# Patient Record
Sex: FEMALE | Race: WHITE | Employment: PART TIME | URBAN - METROPOLITAN AREA
[De-identification: names, ages, dates, MRNs, and addresses within clinical notes are randomized per-mention and may not be internally consistent; named-entity substitution may affect disease eponyms.]

---

## 2017-01-09 ENCOUNTER — HOSPITAL ENCOUNTER (EMERGENCY)
Age: 67
Discharge: HOME | End: 2017-01-09
Attending: EMERGENCY MEDICINE
Payer: COMMERCIAL

## 2017-01-09 VITALS
TEMPERATURE: 97.7 F | HEART RATE: 84 BPM | RESPIRATION RATE: 20 BRPM | DIASTOLIC BLOOD PRESSURE: 72 MMHG | HEIGHT: 65 IN | OXYGEN SATURATION: 99 % | BODY MASS INDEX: 24.99 KG/M2 | SYSTOLIC BLOOD PRESSURE: 154 MMHG | WEIGHT: 150 LBS

## 2017-01-09 DIAGNOSIS — N30.01 ACUTE CYSTITIS WITH HEMATURIA: Primary | ICD-10-CM

## 2017-01-09 LAB
APPEARANCE UR: ABNORMAL
BACTERIA URNS QL MICRO: NEGATIVE /HPF
BILIRUB UR QL: NEGATIVE
COLOR UR: ABNORMAL
EPITH CASTS URNS QL MICRO: ABNORMAL /LPF
GLUCOSE UR STRIP.AUTO-MCNC: NEGATIVE MG/DL
HGB UR QL STRIP: ABNORMAL
HYALINE CASTS URNS QL MICRO: ABNORMAL /LPF (ref 0–5)
KETONES UR QL STRIP.AUTO: NEGATIVE MG/DL
LEUKOCYTE ESTERASE UR QL STRIP.AUTO: ABNORMAL
NITRITE UR QL STRIP.AUTO: NEGATIVE
PH UR STRIP: 6 [PH] (ref 5–8)
PROT UR STRIP-MCNC: 100 MG/DL
RBC #/AREA URNS HPF: >100 /HPF (ref 0–5)
SP GR UR REFRACTOMETRY: 1.01 (ref 1–1.03)
UA: UC IF INDICATED,UAUC: ABNORMAL
UROBILINOGEN UR QL STRIP.AUTO: 0.2 EU/DL (ref 0.2–1)
WBC URNS QL MICRO: >100 /HPF (ref 0–4)

## 2017-01-09 PROCEDURE — 74011250637 HC RX REV CODE- 250/637: Performed by: EMERGENCY MEDICINE

## 2017-01-09 PROCEDURE — 81001 URINALYSIS AUTO W/SCOPE: CPT | Performed by: EMERGENCY MEDICINE

## 2017-01-09 PROCEDURE — 99283 EMERGENCY DEPT VISIT LOW MDM: CPT

## 2017-01-09 PROCEDURE — 87086 URINE CULTURE/COLONY COUNT: CPT | Performed by: EMERGENCY MEDICINE

## 2017-01-09 RX ORDER — CEPHALEXIN 250 MG/1
500 CAPSULE ORAL
Status: COMPLETED | OUTPATIENT
Start: 2017-01-09 | End: 2017-01-09

## 2017-01-09 RX ORDER — PHENAZOPYRIDINE HYDROCHLORIDE 100 MG/1
200 TABLET, FILM COATED ORAL
Status: COMPLETED | OUTPATIENT
Start: 2017-01-09 | End: 2017-01-09

## 2017-01-09 RX ORDER — PHENAZOPYRIDINE HYDROCHLORIDE 200 MG/1
200 TABLET, FILM COATED ORAL 3 TIMES DAILY
Qty: 6 TAB | Refills: 0 | Status: SHIPPED | OUTPATIENT
Start: 2017-01-09 | End: 2017-01-11

## 2017-01-09 RX ORDER — CEPHALEXIN 500 MG/1
500 CAPSULE ORAL 3 TIMES DAILY
Qty: 21 CAP | Refills: 0 | Status: SHIPPED | OUTPATIENT
Start: 2017-01-09 | End: 2017-01-16

## 2017-01-09 RX ADMIN — CEPHALEXIN 500 MG: 250 CAPSULE ORAL at 01:26

## 2017-01-09 RX ADMIN — PHENAZOPYRIDINE HYDROCHLORIDE 200 MG: 100 TABLET ORAL at 01:26

## 2017-01-09 NOTE — ED TRIAGE NOTES
Patient arrives with c/o urinary pain, bleeding, urgency and burning onset today. Denies taking anything for pain.

## 2017-01-09 NOTE — ED PROVIDER NOTES
HPI Comments: 74 y.o. female with past medical history significant for thyroid cancer, thyroidectomy, and overactive bladder who presents from home with chief complaint of dysuria. Pt reports that she just left her house to drive around town when she felt sudden urinary urgency. Pt drove back home and experienced mild dysuria. Pt reports that as the day went on, the frequency and dysuria worsened and was accompanied with hematuria. Pt also reports she feels \"shaky\". Pt reports a similar episode a few years ago on yoselin melody with similar sx and time progression but reports her urine was darker back then. Pt denies N/V, fever, abdominal pain, and back pain. There are no other acute medical concerns at this time. Social hx: nonsmoker, no EtOH use  PCP: Wade Cummings MD  Note written by Ana Rangel, as dictated by Giuliano Tolentino, DO 12:34 AM        The history is provided by the patient. No  was used. No past medical history on file. No past surgical history on file. No family history on file. Social History     Social History    Marital status: UNKNOWN     Spouse name: N/A    Number of children: N/A    Years of education: N/A     Occupational History    Not on file. Social History Main Topics    Smoking status: Not on file    Smokeless tobacco: Not on file    Alcohol use Not on file    Drug use: Not on file    Sexual activity: Not on file     Other Topics Concern    Not on file     Social History Narrative    No narrative on file     ALLERGIES: Review of patient's allergies indicates no known allergies. Review of Systems   Constitutional: Negative for appetite change, chills, fever and unexpected weight change. HENT: Negative for ear pain, hearing loss, rhinorrhea and trouble swallowing. Eyes: Negative for pain and visual disturbance. Respiratory: Negative for cough, chest tightness and shortness of breath.     Cardiovascular: Negative for chest pain and palpitations. Gastrointestinal: Negative for abdominal distention, abdominal pain, blood in stool and vomiting. Genitourinary: Positive for dysuria, frequency and hematuria. Negative for urgency. Musculoskeletal: Negative for back pain and myalgias. Skin: Negative for rash. Neurological: Negative for dizziness, syncope, weakness and numbness. Psychiatric/Behavioral: Negative for confusion and suicidal ideas. All other systems reviewed and are negative. Vitals:    01/09/17 0015   BP: 154/72   Pulse: 84   Resp: 20   Temp: 97.7 °F (36.5 °C)   SpO2: 99%   Weight: 68 kg (150 lb)   Height: 5' 5\" (1.651 m)            Physical Exam   Constitutional: She is oriented to person, place, and time. She appears well-developed and well-nourished. No distress. HENT:   Head: Normocephalic and atraumatic. Right Ear: External ear normal.   Left Ear: External ear normal.   Nose: Nose normal.   Mouth/Throat: Oropharynx is clear and moist. No oropharyngeal exudate. Eyes: Conjunctivae and EOM are normal. Pupils are equal, round, and reactive to light. Right eye exhibits no discharge. Left eye exhibits no discharge. No scleral icterus. Neck: Normal range of motion. Neck supple. No JVD present. No tracheal deviation present. Cardiovascular: Normal rate, regular rhythm, normal heart sounds and intact distal pulses. Exam reveals no gallop and no friction rub. No murmur heard. Pulmonary/Chest: Effort normal and breath sounds normal. No stridor. No respiratory distress. She has no decreased breath sounds. She has no wheezes. She has no rhonchi. She has no rales. She exhibits no tenderness. Abdominal: Soft. Bowel sounds are normal. She exhibits no distension. There is no tenderness. There is no rebound and no guarding. Musculoskeletal: Normal range of motion. She exhibits no edema or tenderness. Neurological: She is alert and oriented to person, place, and time.  She has normal strength and normal reflexes. No cranial nerve deficit or sensory deficit. She exhibits normal muscle tone. Coordination normal. GCS eye subscore is 4. GCS verbal subscore is 5. GCS motor subscore is 6. Skin: Skin is warm and dry. No rash noted. She is not diaphoretic. No erythema. No pallor. Psychiatric: She has a normal mood and affect. Her behavior is normal. Judgment and thought content normal.   Nursing note and vitals reviewed. Note written by Ana Verduzco, as dictated by Libra Uriostegui DO 12:35 AM    MDM  Number of Diagnoses or Management Options  Acute cystitis with hematuria:      Amount and/or Complexity of Data Reviewed  Clinical lab tests: ordered and reviewed    Risk of Complications, Morbidity, and/or Mortality  Presenting problems: low  Diagnostic procedures: low  Management options: low    Patient Progress  Patient progress: stable    ED Course       Procedures  Chief Complaint   Patient presents with    Urinary Pain       1:50 AM  The patients presenting problems have been discussed, and they are in agreement with the care plan formulated and outlined with them. I have encouraged them to ask questions as they arise throughout their visit.     MEDICATIONS GIVEN:  Medications   cephALEXin (KEFLEX) capsule 500 mg (500 mg Oral Given 1/9/17 0126)   phenazopyridine (PYRIDIUM) tablet 200 mg (200 mg Oral Given 1/9/17 0126)       LABS REVIEWED:  Recent Results (from the past 24 hour(s))   URINALYSIS W/ REFLEX CULTURE    Collection Time: 01/09/17 12:37 AM   Result Value Ref Range    Color YELLOW/STRAW      Appearance CLOUDY (A) CLEAR      Specific gravity 1.014 1.003 - 1.030      pH (UA) 6.0 5.0 - 8.0      Protein 100 (A) NEG mg/dL    Glucose NEGATIVE  NEG mg/dL    Ketone NEGATIVE  NEG mg/dL    Bilirubin NEGATIVE  NEG      Blood LARGE (A) NEG      Urobilinogen 0.2 0.2 - 1.0 EU/dL    Nitrites NEGATIVE  NEG      Leukocyte Esterase LARGE (A) NEG      WBC >100 (H) 0 - 4 /hpf    RBC >100 (H) 0 - 5 /hpf    Epithelial cells FEW FEW /lpf    Bacteria NEGATIVE  NEG /hpf    UA:UC IF INDICATED URINE CULTURE ORDERED (A) CNI      Hyaline Cast 0-2 0 - 5 /lpf       VITAL SIGNS:  Patient Vitals for the past 12 hrs:   Temp Pulse Resp BP SpO2   01/09/17 0015 97.7 °F (36.5 °C) 84 20 154/72 99 %       RADIOLOGY RESULTS:  The following have been ordered and reviewed:  No orders to display     PROGRESS NOTES:  Discussed results and plan with patient. Patient will be discharged home with PCP followup. Patient instructed to return to the emergency room for any worsening symptoms or any other concerns. DIAGNOSIS:    1. Acute cystitis with hematuria        PLAN:  Follow-up Information     Follow up With Details Comments Contact Info    Shayne Rg MD In 1 week As needed [id number] [id number] [phone]      [facility] EMERGENCY DEPT  If symptoms worsen [address] [facility]  [phone]        Current Discharge Medication List      START taking these medications    Details   cephALEXin (KEFLEX) 500 mg capsule Take 1 Cap by mouth three (3) times daily for 7 days. Qty: 21 Cap, Refills: 0      phenazopyridine (PYRIDIUM) 200 mg tablet Take 1 Tab by mouth three (3) times daily for 2 days. Qty: 6 Tab, Refills: 0               ED COURSE: The patients hospital course has been uncomplicated.

## 2017-01-09 NOTE — ED NOTES
Dr. Brendan Ibarra has reviewed discharge instructions with patient and self including prescription(s) if applicable. Patient has received and verbalizes understanding of all instructions and has no further questions at this time. Patient exits ED via ambulatory accompanied by self. Patient in no acute distress.

## 2017-01-09 NOTE — DISCHARGE INSTRUCTIONS
We hope that we have addressed all of your medical concerns. The examination and treatment you received in the Emergency Department were for an emergent problem and were not intended as complete care. It is important that you follow up with your healthcare provider(s) for ongoing care. If your symptoms worsen or do not improve as expected, and you are unable to reach your usual health care provider(s), you should return to the Emergency Department. Today's healthcare is undergoing tremendous change, and patient satisfaction surveys are one of the many tools to assess the quality of medical care. You may receive a survey from the [employer] organization regarding your experience in the Emergency Department. I hope that your experience has been completely positive, particularly the medical care that I provided. As such, please participate in the survey; anything less than excellent does not meet my expectations or intentions. [facility] and [facility] participate in nationally recognized quality of care measures. If your blood pressure is greater than 120/80, as reported below, we urge that you seek medical care to address the potential of high blood pressure, commonly known as hypertension. Hypertension can be hereditary or can be caused by certain medical conditions, pain, stress, or \"white coat syndrome. \"       Please make an appointment with your health care provider(s) for follow up of your Emergency Department visit. VITALS:   Patient Vitals for the past 8 hrs:   Temp Pulse Resp BP SpO2   01/09/17 0015 97.7 °F (36.5 °C) 84 20 154/72 99 %          Thank you for allowing us to provide you with medical care today. We realize that you have many choices for your emergency care needs. Please choose us in the future for any continued health care needs. Concepcion Koehler, [facility]. Office: [phone]            Recent Results (from the past 24 hour(s))   URINALYSIS W/ REFLEX CULTURE    Collection Time: 01/09/17 12:37 AM   Result Value Ref Range    Color YELLOW/STRAW      Appearance CLOUDY (A) CLEAR      Specific gravity 1.014 1.003 - 1.030      pH (UA) 6.0 5.0 - 8.0      Protein 100 (A) NEG mg/dL    Glucose NEGATIVE  NEG mg/dL    Ketone NEGATIVE  NEG mg/dL    Bilirubin NEGATIVE  NEG      Blood LARGE (A) NEG      Urobilinogen 0.2 0.2 - 1.0 EU/dL    Nitrites NEGATIVE  NEG      Leukocyte Esterase LARGE (A) NEG      WBC >100 (H) 0 - 4 /hpf    RBC >100 (H) 0 - 5 /hpf    Epithelial cells FEW FEW /lpf    Bacteria NEGATIVE  NEG /hpf    UA:UC IF INDICATED URINE CULTURE ORDERED (A) CNI      Hyaline Cast 0-2 0 - 5 /lpf       No results found. Urinary Tract Infection in Women: Care Instructions  Your Care Instructions    A urinary tract infection, or UTI, is a general term for an infection anywhere between the kidneys and the urethra (where urine comes out). Most UTIs are bladder infections. They often cause pain or burning when you urinate. UTIs are caused by bacteria and can be cured with antibiotics. Be sure to complete your treatment so that the infection goes away. Follow-up care is a key part of your treatment and safety. Be sure to make and go to all appointments, and call your doctor if you are having problems. It's also a good idea to know your test results and keep a list of the medicines you take. How can you care for yourself at home? · Take your antibiotics as directed. Do not stop taking them just because you feel better. You need to take the full course of antibiotics. · Drink extra water and other fluids for the next day or two. This may help wash out the bacteria that are causing the infection.  (If you have kidney, heart, or liver disease and have to limit fluids, talk with your doctor before you increase your fluid intake.)  · Avoid drinks that are carbonated or have caffeine. They can irritate the bladder. · Urinate often. Try to empty your bladder each time. · To relieve pain, take a hot bath or lay a heating pad set on low over your lower belly or genital area. Never go to sleep with a heating pad in place. To prevent UTIs  · Drink plenty of water each day. This helps you urinate often, which clears bacteria from your system. (If you have kidney, heart, or liver disease and have to limit fluids, talk with your doctor before you increase your fluid intake.)  · Consider adding cranberry juice to your diet. · Urinate when you need to. · Urinate right after you have sex. · Change sanitary pads often. · Avoid douches, bubble baths, feminine hygiene sprays, and other feminine hygiene products that have deodorants. · After going to the bathroom, wipe from front to back. When should you call for help? Call your doctor now or seek immediate medical care if:  · Symptoms such as fever, chills, nausea, or vomiting get worse or appear for the first time. · You have new pain in your back just below your rib cage. This is called flank pain. · There is new blood or pus in your urine. · You have any problems with your antibiotic medicine. Watch closely for changes in your health, and be sure to contact your doctor if:  · You are not getting better after taking an antibiotic for 2 days. · Your symptoms go away but then come back. Where can you learn more? Go to http://nellie-pirere.info/. Enter Z678 in the search box to learn more about \"Urinary Tract Infection in Women: Care Instructions. \"  Current as of: August 12, 2016  Content Version: 11.1  © 8490-6918 [employer]. Care instructions adapted under license by [employer] (which disclaims liability or warranty for this information).  If you have questions about a medical condition or this instruction, always ask your healthcare professional. Elizabeth Yu any warranty or liability for your use of this information.

## 2017-01-10 LAB
BACTERIA SPEC CULT: NORMAL
CC UR VC: NORMAL
SERVICE CMNT-IMP: NORMAL

## 2021-02-04 ENCOUNTER — HOSPITAL ENCOUNTER (EMERGENCY)
Age: 71
Discharge: HOME | End: 2021-02-04
Attending: STUDENT IN AN ORGANIZED HEALTH CARE EDUCATION/TRAINING PROGRAM
Payer: MEDICARE

## 2021-02-04 ENCOUNTER — APPOINTMENT (OUTPATIENT)
Dept: GENERAL RADIOLOGY | Age: 71
End: 2021-02-04
Attending: EMERGENCY MEDICINE
Payer: MEDICARE

## 2021-02-04 VITALS
HEART RATE: 66 BPM | BODY MASS INDEX: 26.46 KG/M2 | WEIGHT: 155 LBS | SYSTOLIC BLOOD PRESSURE: 145 MMHG | DIASTOLIC BLOOD PRESSURE: 82 MMHG | RESPIRATION RATE: 18 BRPM | HEIGHT: 64 IN | TEMPERATURE: 97.1 F | OXYGEN SATURATION: 100 %

## 2021-02-04 DIAGNOSIS — R07.89 ATYPICAL CHEST PAIN: Primary | ICD-10-CM

## 2021-02-04 LAB
ALBUMIN SERPL-MCNC: 3.7 G/DL (ref 3.5–5)
ALBUMIN/GLOB SERPL: 1 {RATIO} (ref 1.1–2.2)
ALP SERPL-CCNC: 77 U/L (ref 45–117)
ALT SERPL-CCNC: 26 U/L (ref 12–78)
ANION GAP SERPL CALC-SCNC: 5 MMOL/L (ref 5–15)
AST SERPL-CCNC: 14 U/L (ref 15–37)
BASOPHILS # BLD: 0.1 K/UL (ref 0–0.1)
BASOPHILS NFR BLD: 1 % (ref 0–1)
BILIRUB SERPL-MCNC: 0.9 MG/DL (ref 0.2–1)
BUN SERPL-MCNC: 12 MG/DL (ref 6–20)
BUN/CREAT SERPL: 16 (ref 12–20)
CALCIUM SERPL-MCNC: 8.6 MG/DL (ref 8.5–10.1)
CHLORIDE SERPL-SCNC: 107 MMOL/L (ref 97–108)
CO2 SERPL-SCNC: 28 MMOL/L (ref 21–32)
COMMENT, HOLDF: NORMAL
CREAT SERPL-MCNC: 0.73 MG/DL (ref 0.55–1.02)
DIFFERENTIAL METHOD BLD: ABNORMAL
EOSINOPHIL # BLD: 0.1 K/UL (ref 0–0.4)
EOSINOPHIL NFR BLD: 1 % (ref 0–7)
ERYTHROCYTE [DISTWIDTH] IN BLOOD BY AUTOMATED COUNT: 12 % (ref 11.5–14.5)
GLOBULIN SER CALC-MCNC: 3.8 G/DL (ref 2–4)
GLUCOSE SERPL-MCNC: 88 MG/DL (ref 65–100)
HCT VFR BLD AUTO: 39 % (ref 35–47)
HGB BLD-MCNC: 12.7 G/DL (ref 11.5–16)
IMM GRANULOCYTES # BLD AUTO: 0 K/UL (ref 0–0.04)
IMM GRANULOCYTES NFR BLD AUTO: 0 % (ref 0–0.5)
LYMPHOCYTES # BLD: 3.9 K/UL (ref 0.8–3.5)
LYMPHOCYTES NFR BLD: 43 % (ref 12–49)
MAGNESIUM SERPL-MCNC: 2.4 MG/DL (ref 1.6–2.4)
MCH RBC QN AUTO: 30.4 PG (ref 26–34)
MCHC RBC AUTO-ENTMCNC: 32.6 G/DL (ref 30–36.5)
MCV RBC AUTO: 93.3 FL (ref 80–99)
MONOCYTES # BLD: 0.5 K/UL (ref 0–1)
MONOCYTES NFR BLD: 6 % (ref 5–13)
NEUTS SEG # BLD: 4.5 K/UL (ref 1.8–8)
NEUTS SEG NFR BLD: 49 % (ref 32–75)
NRBC # BLD: 0 K/UL (ref 0–0.01)
NRBC BLD-RTO: 0 PER 100 WBC
PLATELET # BLD AUTO: 233 K/UL (ref 150–400)
PMV BLD AUTO: 9.5 FL (ref 8.9–12.9)
POTASSIUM SERPL-SCNC: 3.9 MMOL/L (ref 3.5–5.1)
PROT SERPL-MCNC: 7.5 G/DL (ref 6.4–8.2)
RBC # BLD AUTO: 4.18 M/UL (ref 3.8–5.2)
SAMPLES BEING HELD,HOLD: NORMAL
SODIUM SERPL-SCNC: 140 MMOL/L (ref 136–145)
TROPONIN I SERPL-MCNC: <0.05 NG/ML
TSH SERPL DL<=0.05 MIU/L-ACNC: 0.45 UIU/ML (ref 0.36–3.74)
WBC # BLD AUTO: 9.1 K/UL (ref 3.6–11)

## 2021-02-04 PROCEDURE — 99283 EMERGENCY DEPT VISIT LOW MDM: CPT

## 2021-02-04 PROCEDURE — 80053 COMPREHEN METABOLIC PANEL: CPT

## 2021-02-04 PROCEDURE — 93005 ELECTROCARDIOGRAM TRACING: CPT

## 2021-02-04 PROCEDURE — 85025 COMPLETE CBC W/AUTO DIFF WBC: CPT

## 2021-02-04 PROCEDURE — 84443 ASSAY THYROID STIM HORMONE: CPT

## 2021-02-04 PROCEDURE — 83735 ASSAY OF MAGNESIUM: CPT

## 2021-02-04 PROCEDURE — 84484 ASSAY OF TROPONIN QUANT: CPT

## 2021-02-04 PROCEDURE — 36415 COLL VENOUS BLD VENIPUNCTURE: CPT

## 2021-02-04 PROCEDURE — 71045 X-RAY EXAM CHEST 1 VIEW: CPT

## 2021-02-04 NOTE — ED TRIAGE NOTES
Pt c/o chest pressure x 2 weeks and sharp chest pains yesterday. States she was trying to make an appt with Dr Alondra Landrum, but was referred here instead. Hx of thyroid cancer.

## 2021-02-04 NOTE — ED PROVIDER NOTES
57-year-old female history of thyroid disease presents to the emergency department today with intermittent chest pressure with episodes of sharp chest pain yesterday. She denies any shortness of breath or nausea or vomiting or fatigue. She has no history of cardiovascular disease but called her primary care doctor today for an appointment tomorrow and was advised to come to the emergency department for further work-up. There is no exertional component to her pain. Sometimes it is worse with eating. The history is provided by the patient and medical records. Chest Pain (Angina)   This is a new problem. The current episode started more than 1 week ago. The problem has not changed since onset. The pain is moderate. The quality of the pain is described as pressure-like and sharp. The pain does not radiate. Pertinent negatives include no abdominal pain, no back pain, no claudication, no cough, no diaphoresis, no exertional chest pressure, no fever, no headaches, no irregular heartbeat, no leg pain, no malaise/fatigue, no nausea, no near-syncope, no palpitations, no shortness of breath, no vomiting and no weakness. No past medical history on file. No past surgical history on file. No family history on file.     Social History     Socioeconomic History    Marital status: SINGLE     Spouse name: Not on file    Number of children: Not on file    Years of education: Not on file    Highest education level: Not on file   Occupational History    Not on file   Social Needs    Financial resource strain: Not on file    Food insecurity     Worry: Not on file     Inability: Not on file    Transportation needs     Medical: Not on file     Non-medical: Not on file   Tobacco Use    Smoking status: Not on file   Substance and Sexual Activity    Alcohol use: Not on file    Drug use: Not on file    Sexual activity: Not on file   Lifestyle    Physical activity     Days per week: Not on file     Minutes per session: Not on file    Stress: Not on file   Relationships    Social connections     Talks on phone: Not on file     Gets together: Not on file     Attends [employer] service: Not on file     Active member of club or organization: Not on file     Attends meetings of clubs or organizations: Not on file     Relationship status: Not on file    Intimate partner violence     Fear of current or ex partner: Not on file     Emotionally abused: Not on file     Physically abused: Not on file     Forced sexual activity: Not on file   Other Topics Concern    Not on file   Social History Narrative    Not on file         ALLERGIES: Patient has no known allergies. Review of Systems   Constitutional: Negative for diaphoresis, fatigue, fever and malaise/fatigue. HENT: Negative for sneezing and sore throat. Respiratory: Negative for cough and shortness of breath. Cardiovascular: Positive for chest pain. Negative for palpitations, claudication, leg swelling and near-syncope. Gastrointestinal: Negative for abdominal pain, diarrhea, nausea and vomiting. Genitourinary: Negative for difficulty urinating and dysuria. Musculoskeletal: Negative for arthralgias, back pain and myalgias. Skin: Negative for color change and rash. Neurological: Negative for weakness and headaches. Psychiatric/Behavioral: Negative for agitation and behavioral problems. Vitals:    02/04/21 1801   BP: (!) 145/82   Pulse: 66   Resp: 18   Temp: 97.1 °F (36.2 °C)   SpO2: 100%   Weight: 70.3 kg (155 lb)   Height: 5' 4\" (1.626 m)            Physical Exam  Vitals signs and nursing note reviewed. Constitutional:       General: She is not in acute distress. Appearance: Normal appearance. She is normal weight. She is not ill-appearing, toxic-appearing or diaphoretic. HENT:      Head: Normocephalic and atraumatic. Nose: Nose normal.      Mouth/Throat:      Mouth: Mucous membranes are moist.      Pharynx: Oropharynx is clear. Eyes:      Extraocular Movements: Extraocular movements intact. Conjunctiva/sclera: Conjunctivae normal.      Pupils: Pupils are equal, round, and reactive to light. Neck:      Musculoskeletal: Normal range of motion and neck supple. No muscular tenderness. Cardiovascular:      Rate and Rhythm: Normal rate and regular rhythm. Pulses: Normal pulses. Heart sounds: Normal heart sounds. No murmur. Pulmonary:      Effort: Pulmonary effort is normal. No respiratory distress. Breath sounds: Normal breath sounds. Abdominal:      General: There is no distension. Palpations: Abdomen is soft. Tenderness: There is no abdominal tenderness. There is no guarding or rebound. Musculoskeletal: Normal range of motion. General: No swelling, tenderness, deformity or signs of injury. Right lower leg: No edema. Left lower leg: No edema. Skin:     General: Skin is warm and dry. Capillary Refill: Capillary refill takes less than 2 seconds. Neurological:      General: No focal deficit present. Mental Status: She is alert and oriented to person, place, and time. Psychiatric:         Mood and Affect: Mood normal.         Behavior: Behavior normal.          MDM  Number of Diagnoses or Management Options  Diagnosis management comments: 47-year-old female presents as above with episodes of chest pain. Her work-up in the emergency department has been reassuring with normal troponin and EKG and chest x-ray and exam.  She is low risk by heart score criteria with age is her only risk factor. Plan to discharge with instructions to follow-up with primary care and return if needed. Amount and/or Complexity of Data Reviewed  Clinical lab tests: reviewed  Tests in the radiology section of CPT®: reviewed           Procedures        ED EKG interpretation:  Rhythm: normal sinus rhythm.  Rate (approx.): 64.  Axis: normal.  ST segment:  No concerning ST elevations or depressions. This EKG was interpreted by Ericka Ward MD,ED Provider.

## 2021-02-05 NOTE — DISCHARGE INSTRUCTIONS
Please follow-up with your primary care doctor in about 3 days. You are low risk for cardiovascular disease, however, this does not mean no risk. Please return to the emergency department for new or worsening symptoms or other concerns.

## 2021-02-07 LAB
ATRIAL RATE: 64 BPM
CALCULATED P AXIS, ECG09: 73 DEGREES
CALCULATED R AXIS, ECG10: 3 DEGREES
CALCULATED T AXIS, ECG11: 79 DEGREES
DIAGNOSIS, 93000: NORMAL
P-R INTERVAL, ECG05: 158 MS
Q-T INTERVAL, ECG07: 428 MS
QRS DURATION, ECG06: 90 MS
QTC CALCULATION (BEZET), ECG08: 441 MS
VENTRICULAR RATE, ECG03: 64 BPM

## 2022-08-04 ENCOUNTER — OFFICE VISIT (OUTPATIENT)
Dept: HEMATOLOGY | Age: 72
End: 2022-08-04
Payer: MEDICARE

## 2022-08-04 VITALS
DIASTOLIC BLOOD PRESSURE: 75 MMHG | HEART RATE: 89 BPM | TEMPERATURE: 96.4 F | SYSTOLIC BLOOD PRESSURE: 140 MMHG | WEIGHT: 159 LBS | HEIGHT: 64 IN | BODY MASS INDEX: 27.14 KG/M2 | RESPIRATION RATE: 16 BRPM | OXYGEN SATURATION: 98 %

## 2022-08-04 DIAGNOSIS — Z11.59 ENCOUNTER FOR SCREENING FOR OTHER VIRAL DISEASES: ICD-10-CM

## 2022-08-04 DIAGNOSIS — R97.8 OTHER ABNORMAL TUMOR MARKERS: ICD-10-CM

## 2022-08-04 DIAGNOSIS — R16.0 LIVER MASS: Primary | ICD-10-CM

## 2022-08-04 PROBLEM — E89.0 H/O THYROIDECTOMY: Status: ACTIVE | Noted: 2022-08-04

## 2022-08-04 PROBLEM — K21.9 GERD (GASTROESOPHAGEAL REFLUX DISEASE): Status: ACTIVE | Noted: 2022-08-04

## 2022-08-04 PROBLEM — N39.0 FREQUENT UTI: Status: ACTIVE | Noted: 2022-08-04

## 2022-08-04 PROBLEM — C73 THYROID CANCER (HCC): Status: ACTIVE | Noted: 2022-08-04

## 2022-08-04 PROBLEM — E03.9 ACQUIRED HYPOTHYROIDISM: Status: ACTIVE | Noted: 2022-08-04

## 2022-08-04 PROCEDURE — 99203 OFFICE O/P NEW LOW 30 MIN: CPT | Performed by: INTERNAL MEDICINE

## 2022-08-04 PROCEDURE — G8400 PT W/DXA NO RESULTS DOC: HCPCS | Performed by: INTERNAL MEDICINE

## 2022-08-04 PROCEDURE — 3017F COLORECTAL CA SCREEN DOC REV: CPT | Performed by: INTERNAL MEDICINE

## 2022-08-04 PROCEDURE — 1123F ACP DISCUSS/DSCN MKR DOCD: CPT | Performed by: INTERNAL MEDICINE

## 2022-08-04 PROCEDURE — 1090F PRES/ABSN URINE INCON ASSESS: CPT | Performed by: INTERNAL MEDICINE

## 2022-08-04 PROCEDURE — G0463 HOSPITAL OUTPT CLINIC VISIT: HCPCS | Performed by: INTERNAL MEDICINE

## 2022-08-04 PROCEDURE — G8510 SCR DEP NEG, NO PLAN REQD: HCPCS | Performed by: INTERNAL MEDICINE

## 2022-08-04 PROCEDURE — G8427 DOCREV CUR MEDS BY ELIG CLIN: HCPCS | Performed by: INTERNAL MEDICINE

## 2022-08-04 PROCEDURE — G8417 CALC BMI ABV UP PARAM F/U: HCPCS | Performed by: INTERNAL MEDICINE

## 2022-08-04 PROCEDURE — 1101F PT FALLS ASSESS-DOCD LE1/YR: CPT | Performed by: INTERNAL MEDICINE

## 2022-08-04 PROCEDURE — G8536 NO DOC ELDER MAL SCRN: HCPCS | Performed by: INTERNAL MEDICINE

## 2022-08-04 RX ORDER — TRIAMCINOLONE ACETONIDE 0.25 MG/G
OINTMENT TOPICAL
COMMUNITY
Start: 2022-05-20

## 2022-08-04 RX ORDER — CALCIUM CARBONATE/VITAMIN D3 250-3.125
1 TABLET ORAL DAILY
COMMUNITY
End: 2022-10-11

## 2022-08-04 RX ORDER — LEVOTHYROXINE SODIUM 100 UG/1
TABLET ORAL
COMMUNITY
Start: 2022-05-26

## 2022-08-04 RX ORDER — CRANBERRY FRUIT 450 MG
2 TABLET ORAL DAILY
COMMUNITY

## 2022-08-04 RX ORDER — OMEPRAZOLE 40 MG/1
CAPSULE, DELAYED RELEASE ORAL
COMMUNITY

## 2022-08-04 RX ORDER — VIBEGRON 75 MG/1
75 TABLET, FILM COATED ORAL DAILY
COMMUNITY
Start: 2022-07-16

## 2022-08-04 RX ORDER — PREDNISONE 10 MG/1
TABLET ORAL
COMMUNITY
Start: 2022-08-03 | End: 2022-10-11

## 2022-08-04 RX ORDER — BETAMETHASONE DIPROPIONATE 0.5 MG/G
CREAM TOPICAL 2 TIMES DAILY
COMMUNITY
Start: 2022-03-02

## 2022-08-04 NOTE — PROGRESS NOTES
Identified pt with two pt identifiers(name and ). Reviewed record in preparation for visit and have obtained necessary documentation. Chief Complaint   Patient presents with    New Patient    Other     Liver lesion, establish care      Vitals:    22 0946   BP: (!) 140/75   Pulse: 89   Resp: 16   Temp: (!) 96.4 °F (35.8 °C)   TempSrc: Temporal   SpO2: 98%   Weight: 159 lb (72.1 kg)   Height: 5' 4\" (1.626 m)   PainSc:   0 - No pain       Health Maintenance Review: Patient reminded of \"due or due soon\" health maintenance. I have asked the patient to contact his/her primary care provider (PCP) for follow-up on his/her health maintenance. Coordination of Care Questionnaire:  :   1) Have you been to an emergency room, urgent care, or hospitalized since your last visit? If yes, where when, and reason for visit? no       2. Have seen or consulted any other health care provider since your last visit? If yes, where when, and reason for visit? NO      Patient is accompanied by self I have received verbal consent from Morro Bellamy to discuss any/all medical information while they are present in the room.

## 2022-08-04 NOTE — PROGRESS NOTES
[facility]", MD, [address], [place], [place]      AUGUSTINE Thomas, [facility]     Mehnaz Bradford, [facility]   SALTY Sweeney-LYNNE Rawls, [facility]       Ashley Klein [facility] 136    at [facility]    [address] [employer], [address] [place] [address], [facility] 22.    [phone]    FAX: [address] [place] [address]    at [address], [address]    [phone]    FAX: [phone]       Patient Care Team:  Tung Rivera MD as PCP - General (Family Medicine)  Tung Rivera MD as PCP - [facility] Provider  Josué German MD (Urology)      Problem List  Date Reviewed: 8/4/2022            Codes Class Noted    Liver mass ICD-10-CM: R16.0  ICD-9-CM: 573.8  8/4/2022        Frequent UTI ICD-10-CM: N39.0  ICD-9-CM: 599.0  8/4/2022        Thyroid cancer ([facility] ICD-10-CM: C73  ICD-9-CM: 193  8/4/2022        H/O thyroidectomy ICD-10-CM: E89.0  ICD-9-CM: 246.8  8/4/2022        Acquired hypothyroidism ICD-10-CM: E03.9  ICD-9-CM: 244.9  8/4/2022        GERD (gastroesophageal reflux disease) ICD-10-CM: K21.9  ICD-9-CM: 530.81  8/4/2022           The clinicians listed above have asked me to see Monique Hall in consultation regarding a liver mass. All medical records sent by the referring physicians were reviewed including imaging studies     The patient is a 66 y.o.  female without any history of previous liver disease. The patient underwent a CT scan without contrast for evaluation of frequent UTIs in 7/2022. This demonstrated a single mass measuring 2.8 cm in the right lobe segment 6 of the liver. The following cancer makers were performed; CEA, CA-125.   Both were negative    Imaging studies of the liver are not available suggest a normal liver in addition to the mass. The patient does not have any symptoms which could be attributed to the liver disorder. The patient is not experiencing the following symptoms which are commonly seen in this liver disorder:   fatigue,   pain in the right side over the liver,     The patient completes all daily activities without any functional limitations. ASSESSMENT AND PLAN:  Liver mass   This is of unknown etiology but most likely to be benign. The patient is asymptomatic     Have performed laboratory testing to monitor liver function and degree of liver injury. This included BMP, hepatic panel, CBC with platelet count, INR. Will measure the following serologic cancer markers AFP, CA 19-9, CEA. The  was mildly elevated at 49. Serologic testing for causes of chronic liver disease was negative HCV, HBV     Will perform imaging of the liver with dynamic MRI. Treatment of other medical problems in patients with chronic liver disease  There are no contraindications for the patient to take most medications that are necessary for treatment of other medical issues. Counseling for alcohol in patients with chronic liver disease  The patient was counseled regarding alcohol consumption and the effect of alcohol on chronic liver disease. The patient does not consume any significant amount of alcohol. Vaccinations   Since the patient does not have a chronic liver disease which can lead to liver injury screening for HAV and HBV is not needed. Routine vaccinations against other bacterial and viral agents can be performed as indicated. Annual flu vaccination should be administered if indicated.         ALLERGIES  Allergies   Allergen Reactions    Other Medication Rash     Americaine Topical Spray    Other Plant, Animal, Environmental Rash     Bandages    Benzocaine Rash    Epinephrine Unknown (comments)     Affected breathing caused muscle spasms in back during a dental procedure Hydrocortisone Rash       MEDICATIONS  Current Outpatient Medications   Medication Sig    LevoxyL 100 mcg tablet TAKE 1 TABLET BY MOUTH EVERY DAY IN THE MORNING ON EMPTY STOMACH FOR 90 DAYS    predniSONE (STERAPRED DS) 10 mg dose pack     Gemtesa 75 mg tab Take 75 mg by mouth daily. triamcinolone acetonide (KENALOG) 0.025 % ointment APPLY TOPICALLY 2 TIMES DAILY AS NEEDED FOR RASH FOR UP TO 14 DAYS. FOR UNDERARMS    betamethasone dipropionate (DIPROSONE) 0.05 % topical cream Apply  to affected area two (2) times a day. calcium-vitamin D (Oyster Shell Calcium-Vit D3) 250 mg-125 unit per tablet Take 1 Tablet by mouth in the morning. cranberry extract (Cranberry) 450 mg tab tablet Take 2 Tablets by mouth daily. cyanocobalamin ER 1,000 mcg tablet Take 5,000 Tablets by mouth in the morning. omeprazole (PRILOSEC) 40 mg capsule omeprazole 40 mg capsule,delayed release(DR/EC)    calcium/folic ac/multivit-min (VIACTIV MULTI-VITAMIN PO) Take  by mouth daily. polyvinyl alcohol-povidon,PF, (REFRESH CLASSIC) 1.4-0.6 % ophthalmic solution Administer 1-2 Drops to both eyes as needed. L. rhamnosus GG/inulin ([facility]) Take  by mouth as needed. No current facility-administered medications for this visit. SYSTEM REVIEW NOT RELATED TO LIVER DISEASE OR REVIEWED ABOVE:  Constitution systems: Negative for fever, chills, weight gain, weight loss. Eyes: Negative for visual changes. ENT: Negative for sore throat, painful swallowing. Respiratory: Negative for cough, hemoptysis, SOB. Cardiology: Negative for chest pain, palpitations. GI:  Negative for constipation or diarrhea. : Negative for urinary frequency, dysuria, hematuria, nocturia. Skin: Negative for rash. Hematology: Negative for easy bruising, blood clots. Musculo-skelatal: Negative for back pain, muscle pain, weakness.   Neurologic: Negative for headaches, dizziness, vertigo, memory problems not related to HE.  Psychology: Negative for anxiety, depression. FAMILY HISTORY:  The father  of dementia. The mother  of breast cancer. There is no family history of liver disease. SOCIAL HISTORY:  The patient is . The patient has 2 children, and 1 grandchildren. The patient has never used tobacco products. The patient has never consumed significant amounts of alcohol. 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 PHYSICAL EXAMINATION:  Visit Vitals  BP (!) 140/75 (BP 1 Location: Left upper arm, BP Patient Position: Sitting, BP Cuff Size: Adult)   Pulse 89   Temp (!) 96.4 °F (35.8 °C) (Temporal)   Resp 16   Ht 5' 4\" (1.626 m)   Wt 159 lb (72.1 kg)   SpO2 98%   BMI 27.29 kg/m²     General: No acute distress. Eyes: Sclera anicteric. ENT: No oral lesions. Thyroid normal.  Nodes: No adenopathy. Skin: No spider angiomata. No jaundice. No palmar erythema. Respiratory: Lungs clear to auscultation. Cardiovascular: Regular heart rate. No murmurs. No JVD. Abdomen: Soft non-tender. Liver size normal to percussion/palpation. Spleen not palpable. No obvious ascites. Extremities: No edema. No muscle wasting. No gross arthritic changes. Neurologic: Alert and oriented. Cranial nerves grossly intact. No asterixis.     LABORATORY STUDIES:  Liver [place] of [address] Units 2022   WBC 3.6 - 11.0 K/uL 15.3 (H) 9.1   ANC 1.8 - 8.0 K/UL 12.8 (H) 4.5   HGB 11.5 - 16.0 g/dL 13.0 12.7    - 400 K/uL 286 233   AST 15 - 37 U/L 9 (L) 14 (L)   ALT 12 - 78 U/L 24 26   Alk Phos 45 - 117 U/L 81 77   Bili, Total 0.2 - 1.0 MG/DL 0.5 0.9   Bili, Direct 0.0 - 0.2 MG/DL 0.1    Albumin 3.5 - 5.0 g/dL 3.9 3.7   BUN 6 - 20 MG/DL 18 12   Creat 0.55 - 1.02 MG/DL 0.80 0.73   Na 136 - 145 mmol/L 139 140   K 3.5 - 5.1 mmol/L 4.0 3.9   Cl 97 - 108 mmol/L 107 107   CO2 21 - 32 mmol/L 24 28   Glucose 65 - 100 mg/dL 149 (H) 88   Magnesium 1.6 - 2.4 mg/dL  2.4       SEROLOGIES:  Serologies Latest Ref Rng & Units 8/4/2022   Hep B Surface Ag Index <0.10   Hep B Surface Ag Interp Negative   Negative   Hep C Ab 0.0 - 0.9 s/co ratio <0.1     LIVER HISTOLOGY:  Not available or performed    ENDOSCOPIC PROCEDURES:  Not available or performed    RADIOLOGY:  7/2022. CT scan abdomen without IV contrast.  Normal appearing liver. No liver mass lesions. Normal spleen. No ascites. OTHER TESTING:  Not available or performed    FOLLOW-UP:  All of the issues listed above in the Assessment and Plan were discussed with the patient. All questions were answered. The patient expressed a clear understanding of the above. [facility] 87 in 4 weeks which should be 1-2 weeks after the next imaging study.         Chelo Gottlieb MD  [facility] [address] [place] [address], [address] [place] [address] 22.  [phone]  [address] [place] [address]

## 2022-08-04 NOTE — Clinical Note
8/28/2022    Patient: Lenora Reed   YOB: 1950   Date of Visit: 8/4/2022     Harmony Mcginnis MD  [address] [facility]  [address] [facility] [address]  Via Fax: [phone]    Dear Harmony Mcginnis MD,      Thank you for referring Ms. Lenora Reed to [address] for evaluation. My notes for this consultation are attached. If you have questions, please do not hesitate to call me. I look forward to following your patient along with you.       Sincerely,    Sha Heredia MD

## 2022-08-05 LAB
ALBUMIN SERPL-MCNC: 3.9 G/DL (ref 3.5–5)
ALBUMIN/GLOB SERPL: 1.1 {RATIO} (ref 1.1–2.2)
ALP SERPL-CCNC: 81 U/L (ref 45–117)
ALT SERPL-CCNC: 24 U/L (ref 12–78)
ANION GAP SERPL CALC-SCNC: 8 MMOL/L (ref 5–15)
AST SERPL-CCNC: 9 U/L (ref 15–37)
BASOPHILS # BLD: 0 K/UL (ref 0–0.1)
BASOPHILS NFR BLD: 0 % (ref 0–1)
BILIRUB DIRECT SERPL-MCNC: 0.1 MG/DL (ref 0–0.2)
BILIRUB SERPL-MCNC: 0.5 MG/DL (ref 0.2–1)
BUN SERPL-MCNC: 18 MG/DL (ref 6–20)
BUN/CREAT SERPL: 23 (ref 12–20)
CALCIUM SERPL-MCNC: 9.5 MG/DL (ref 8.5–10.1)
CHLORIDE SERPL-SCNC: 107 MMOL/L (ref 97–108)
CO2 SERPL-SCNC: 24 MMOL/L (ref 21–32)
CREAT SERPL-MCNC: 0.8 MG/DL (ref 0.55–1.02)
DIFFERENTIAL METHOD BLD: ABNORMAL
EOSINOPHIL # BLD: 0.5 K/UL (ref 0–0.4)
EOSINOPHIL NFR BLD: 3 % (ref 0–7)
ERYTHROCYTE [DISTWIDTH] IN BLOOD BY AUTOMATED COUNT: 12.3 % (ref 11.5–14.5)
GLOBULIN SER CALC-MCNC: 3.7 G/DL (ref 2–4)
GLUCOSE SERPL-MCNC: 149 MG/DL (ref 65–100)
HBV SURFACE AG SER QL: <0.1 INDEX
HBV SURFACE AG SER QL: NEGATIVE
HCT VFR BLD AUTO: 39.8 % (ref 35–47)
HGB BLD-MCNC: 13 G/DL (ref 11.5–16)
IMM GRANULOCYTES # BLD AUTO: 0.2 K/UL (ref 0–0.04)
IMM GRANULOCYTES NFR BLD AUTO: 1 % (ref 0–0.5)
LYMPHOCYTES # BLD: 1.3 K/UL (ref 0.8–3.5)
LYMPHOCYTES NFR BLD: 9 % (ref 12–49)
MCH RBC QN AUTO: 30.7 PG (ref 26–34)
MCHC RBC AUTO-ENTMCNC: 32.7 G/DL (ref 30–36.5)
MCV RBC AUTO: 93.9 FL (ref 80–99)
MONOCYTES # BLD: 0.4 K/UL (ref 0–1)
MONOCYTES NFR BLD: 3 % (ref 5–13)
NEUTS SEG # BLD: 12.8 K/UL (ref 1.8–8)
NEUTS SEG NFR BLD: 84 % (ref 32–75)
NRBC # BLD: 0 K/UL (ref 0–0.01)
NRBC BLD-RTO: 0 PER 100 WBC
PLATELET # BLD AUTO: 286 K/UL (ref 150–400)
PMV BLD AUTO: 10.4 FL (ref 8.9–12.9)
POTASSIUM SERPL-SCNC: 4 MMOL/L (ref 3.5–5.1)
PROT SERPL-MCNC: 7.6 G/DL (ref 6.4–8.2)
RBC # BLD AUTO: 4.24 M/UL (ref 3.8–5.2)
SODIUM SERPL-SCNC: 139 MMOL/L (ref 136–145)
WBC # BLD AUTO: 15.3 K/UL (ref 3.6–11)

## 2022-08-06 LAB
CANCER AG19-9 SERPL-ACNC: 49 U/ML (ref 0–35)
HCV AB S/CO SERPL IA: <0.1 S/CO RATIO (ref 0–0.9)
HCV AB SERPL QL IA: NORMAL

## 2022-08-12 LAB
AFP L3 MFR SERPL: NORMAL % (ref 0–9.9)
AFP SERPL-MCNC: 2.1 NG/ML (ref 0–9.2)

## 2022-08-28 PROBLEM — Z90.49 HISTORY OF CHOLECYSTECTOMY: Status: ACTIVE | Noted: 2022-08-28

## 2022-09-02 ENCOUNTER — HOSPITAL ENCOUNTER (OUTPATIENT)
Dept: MRI IMAGING | Age: 72
Discharge: HOME | End: 2022-09-02
Attending: INTERNAL MEDICINE
Payer: MEDICARE

## 2022-09-02 DIAGNOSIS — R16.0 LIVER MASS: ICD-10-CM

## 2022-09-02 PROCEDURE — 74011000258 HC RX REV CODE- 258: Performed by: INTERNAL MEDICINE

## 2022-09-02 PROCEDURE — 74183 MRI ABD W/O CNTR FLWD CNTR: CPT

## 2022-09-02 PROCEDURE — 74011250636 HC RX REV CODE- 250/636

## 2022-09-02 PROCEDURE — 74011000250 HC RX REV CODE- 250: Performed by: INTERNAL MEDICINE

## 2022-09-02 PROCEDURE — A9576 INJ PROHANCE MULTIPACK: HCPCS

## 2022-09-02 RX ORDER — SODIUM CHLORIDE 0.9 % (FLUSH) 0.9 %
10 SYRINGE (ML) INJECTION
Status: COMPLETED | OUTPATIENT
Start: 2022-09-02 | End: 2022-09-02

## 2022-09-02 RX ADMIN — GADOTERIDOL 15 ML: 279.3 INJECTION, SOLUTION INTRAVENOUS at 12:40

## 2022-09-02 RX ADMIN — SODIUM CHLORIDE 100 ML: 900 INJECTION, SOLUTION INTRAVENOUS at 12:41

## 2022-09-02 RX ADMIN — SODIUM CHLORIDE, PRESERVATIVE FREE 10 ML: 5 INJECTION INTRAVENOUS at 12:41

## 2022-09-16 ENCOUNTER — OFFICE VISIT (OUTPATIENT)
Dept: HEMATOLOGY | Age: 72
End: 2022-09-16
Payer: MEDICARE

## 2022-09-16 VITALS
RESPIRATION RATE: 16 BRPM | HEART RATE: 72 BPM | OXYGEN SATURATION: 96 % | DIASTOLIC BLOOD PRESSURE: 80 MMHG | WEIGHT: 161 LBS | TEMPERATURE: 96.4 F | BODY MASS INDEX: 27.49 KG/M2 | HEIGHT: 64 IN | SYSTOLIC BLOOD PRESSURE: 135 MMHG

## 2022-09-16 DIAGNOSIS — D18.03 HEMANGIOMA OF LIVER: Primary | ICD-10-CM

## 2022-09-16 PROBLEM — H52.4 PRESBYOPIA: Status: ACTIVE | Noted: 2020-08-28

## 2022-09-16 PROBLEM — M54.9 BACK PAIN: Status: ACTIVE | Noted: 2022-07-11

## 2022-09-16 PROBLEM — N83.292 OTHER OVARIAN CYST, LEFT SIDE: Status: ACTIVE | Noted: 2022-07-25

## 2022-09-16 PROCEDURE — 1123F ACP DISCUSS/DSCN MKR DOCD: CPT | Performed by: INTERNAL MEDICINE

## 2022-09-16 PROCEDURE — G8428 CUR MEDS NOT DOCUMENT: HCPCS | Performed by: INTERNAL MEDICINE

## 2022-09-16 PROCEDURE — 99214 OFFICE O/P EST MOD 30 MIN: CPT | Performed by: INTERNAL MEDICINE

## 2022-09-16 PROCEDURE — 3017F COLORECTAL CA SCREEN DOC REV: CPT | Performed by: INTERNAL MEDICINE

## 2022-09-16 PROCEDURE — G8510 SCR DEP NEG, NO PLAN REQD: HCPCS | Performed by: INTERNAL MEDICINE

## 2022-09-16 PROCEDURE — G8536 NO DOC ELDER MAL SCRN: HCPCS | Performed by: INTERNAL MEDICINE

## 2022-09-16 PROCEDURE — 1090F PRES/ABSN URINE INCON ASSESS: CPT | Performed by: INTERNAL MEDICINE

## 2022-09-16 PROCEDURE — G0463 HOSPITAL OUTPT CLINIC VISIT: HCPCS | Performed by: INTERNAL MEDICINE

## 2022-09-16 PROCEDURE — 1101F PT FALLS ASSESS-DOCD LE1/YR: CPT | Performed by: INTERNAL MEDICINE

## 2022-09-16 PROCEDURE — G8400 PT W/DXA NO RESULTS DOC: HCPCS | Performed by: INTERNAL MEDICINE

## 2022-09-16 PROCEDURE — G8417 CALC BMI ABV UP PARAM F/U: HCPCS | Performed by: INTERNAL MEDICINE

## 2022-09-16 RX ORDER — OFLOXACIN 3 MG/ML
SOLUTION/ DROPS OPHTHALMIC
COMMUNITY
Start: 2022-08-15

## 2022-09-16 NOTE — PROGRESS NOTES
[facility], MD, FACP, [facility], [place]      Marianne Velazquez, AUGUSTINE Bradford, [facility]-BC   Brittany Oconnor, [facility]-   Rosy Lowe, FNP-C   Perico Snyder, FNP-C    Daron Abdi, [facility]       Ashley Klein [facility] 136    at [address], [address] [place] [address], [place] [address].    [phone]    FAX: [address] [place] [address], [address]    [phone]    FAX: [phone]       Patient Care Team:  Dot Chandler MD as PCP - General (Family Medicine)  Dot Chandler MD as PCP - [facility]  Sigifredo Morrison MD (Urology)      Problem List  Date Reviewed: 10/11/2022            Codes Class Noted    Liver cyst ICD-10-CM: K76.89  ICD-9-CM: 573.8  10/11/2022        History of cholecystectomy ICD-10-CM: Z90.49  ICD-9-CM: V45.79  8/28/2022        Hemangioma of liver ICD-10-CM: D18.03  ICD-9-CM: 228.04  8/4/2022        Frequent UTI ICD-10-CM: N39.0  ICD-9-CM: 599.0  8/4/2022        Thyroid [facility] ([facility] ICD-10-CM: C73  ICD-9-CM: 193  8/4/2022        H/O thyroidectomy ICD-10-CM: E89.0  ICD-9-CM: 246.8  8/4/2022        Acquired hypothyroidism ICD-10-CM: E03.9  ICD-9-CM: 244.9  8/4/2022        GERD (gastroesophageal reflux disease) ICD-10-CM: K21.9  ICD-9-CM: 530.81  8/4/2022        Other ovarian cyst, left side ICD-10-CM: N83.292  ICD-9-CM: 620.2  7/25/2022        Back pain ICD-10-CM: M54.9  ICD-9-CM: 724.5  7/11/2022        Presbyopia ICD-10-CM: H52.4  ICD-9-CM: 367.4  8/28/2020        Urge incontinence ICD-10-CM: N39.41  ICD-9-CM: 788.31  1/29/2015        Age-related nuclear cataract, right eye ICD-10-CM: H25.11  ICD-9-CM: 366.16  7/23/2014        Ptosis of eyelid ICD-10-CM: H02.409  ICD-9-CM: 374.30  6/26/2013           Aliyah Barba Olin Hammans is being seen at [place] [address] for management of a liver mass. The active problem list, all pertinent past medical history, medications, radiologic findings and laboratory findings related to the liver disorder were reviewed and discussed with the patient. The patient is a 74 y.o.  female without any history of previous liver disease. The patient underwent a CT scan without contrast for evaluation of frequent UTIs in 7/2022. This demonstrated a single mass measuring 2.8 cm in the right lobe segment 6 of the liver. The most recent imaging of the liver was MRI performed in 9/2022. Results suggest the liver is normal.  There are several cysts scattered through out the liver. The largest cyst is 4.0 cm in right lobe segment 8..  The liver mass in right lobe segment 6 2.9 x 2.5 cm is a hemangioma. Liver cancer markers were significant for a  of 47. AFP and CEA were normal.    The patient does not have any symptoms which could be attributed to the liver disorder. The patient is not experiencing the following symptoms which are commonly seen in this liver disorder:   fatigue,   pain in the right side over the liver,     The patient completes all daily activities without any functional limitations. ASSESSMENT AND PLAN:  Hemangioma  There is a 2.9 x 2.5 cm liver mass in right lobe segment 6 with characteristics of hemangioma on MRI. These lesions are benign and only rarely increase in size over time. They are not responsive to hormonal therapy and HRT or OCH do not need to be stopped. Spontaneous bleeding from large Hemangiomas has been reported but is extremely rare and typically associated with abdominal trauma. No further imaging of the hemangioma is needed. Liver cysts  The patient has several cysts of varying sizes throughout out the liver. The largest cyst 4 cm is in right lobe segment 8.     Liver transaminases are normal.  ALP is normal. Liver function is normal.  The platelet count is normal.      Serologic testing for causes of chronic liver disease was negative     Liver cysts are common and benign over 99% of the time. They are not responsive to hormonal therapy and HRT or OCH do not need to be stopped. The vast majority of liver cysts do not enlarge over time. The  most common reason for cysts to change size, either larger or smaller, is that they were measured with different techniques, ie. US, CT, or MRI, on different scanners and and the size was assessed by different radiologists. Routine scanning of the liver to assess for a change in size of the cysts is not indicated or helpful in the management. Elevation in   The etiology for this is not clear. There is no suggestion of any malignancy in the pancreas or liver on the current MRI. I think the safest thing at this point is to repeat the  and a CT scan in 6 months. She is asking if this can be done by Dr Sonali Dutton. I will call and discuss this with him. Treatment of other medical problems in patients with chronic liver disease  There are no contraindications for the patient to take most medications that are necessary for treatment of other medical issues. Counseling for alcohol in patients with chronic liver disease  The patient was counseled regarding alcohol consumption and the effect of alcohol on chronic liver disease. The patient does not consume any significant amount of alcohol. Vaccinations   Since the patient does not have a chronic liver disease which can lead to liver injury screening for HAV and HBV is not needed. The patient has received 2 doses and the booster dose of COVID-19 vaccine. Routine vaccinations against other bacterial and viral agents can be performed as indicated. Annual flu vaccination should be administered if indicated.         ALLERGIES  Allergies   Allergen Reactions    Other Medication Rash     Americaine Topical Spray    Other Plant, Animal, Environmental Rash     Bandages    Benzocaine Rash    Adhesive Rash    Epinephrine Unknown (comments)     Affected breathing caused muscle spasms in back during a dental procedure     Hydrocortisone Rash       MEDICATIONS  Current Outpatient Medications   Medication Sig    ofloxacin (FLOXIN) 0.3 % ophthalmic solution 1 DROP IN LEFT EYE OPHTHALMIC THREE TIMES A DAY 7 DAY(S)    LevoxyL 100 mcg tablet TAKE 1 TABLET BY MOUTH EVERY DAY IN THE MORNING ON EMPTY STOMACH FOR 90 DAYS    Gemtesa 75 mg tab Take 75 mg by mouth daily. triamcinolone acetonide (KENALOG) 0.025 % ointment APPLY TOPICALLY 2 TIMES DAILY AS NEEDED FOR RASH FOR UP TO 14 DAYS. FOR UNDERARMS    betamethasone dipropionate (DIPROSONE) 0.05 % topical cream Apply  to affected area two (2) times a day. cranberry extract (Cranberry) 450 mg tab tablet Take 2 Tablets by mouth daily. CYANOCOBALAMIN, VITAMIN B-12, PO Take 1,000 mcg by mouth daily. Taking 5,000 mcg daily    omeprazole (PRILOSEC) 40 mg capsule omeprazole 40 mg capsule,delayed release(DR/EC)    calcium/folic ac/multivit-min (VIACTIV MULTI-VITAMIN PO) Take  by mouth daily. polyvinyl alcohol-povidon,PF, (REFRESH CLASSIC) 1.4-0.6 % ophthalmic solution Administer 1-2 Drops to both eyes as needed. L. rhamnosus GG/inulin ([address] [place] [address]) Take  by mouth as needed. predniSONE (STERAPRED DS) 10 mg dose pack     calcium-vitamin D (OS-KAY +D3) 250 mg-125 unit per tablet Take 1 Tablet by mouth in the morning. (Patient not taking: Reported on 9/16/2022)     No current facility-administered medications for this visit. SYSTEM REVIEW NOT RELATED TO LIVER DISEASE OR REVIEWED ABOVE:  Constitution systems: Negative for fever, chills, weight gain, weight loss. Eyes: Negative for visual changes. ENT: Negative for sore throat, painful swallowing. Respiratory: Negative for cough, hemoptysis, SOB.    Cardiology: Negative for chest pain, palpitations. GI:  Negative for constipation or diarrhea. : Negative for urinary frequency, dysuria, hematuria, nocturia. Skin: Negative for rash. Hematology: Negative for easy bruising, blood clots. Musculo-skelatal: Negative for back pain, muscle pain, weakness. Neurologic: Negative for headaches, dizziness, vertigo, memory problems not related to HE. Psychology: Negative for anxiety, depression. FAMILY HISTORY:  The father  of dementia. The mother  of breast cancer. There is no family history of liver disease. SOCIAL HISTORY:  The patient is . The patient has 2 children, and 1 grandchildren. The patient has never used tobacco products. The patient has never consumed significant amounts of alcohol. 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 PHYSICAL EXAMINATION:  Visit Vitals  /80 (BP 1 Location: Left upper arm, BP Patient Position: Sitting, BP Cuff Size: Adult)   Pulse 72   Temp (!) 96.4 °F (35.8 °C) (Temporal)   Resp 16   Ht 5' 4\" (1.626 m)   Wt 161 lb (73 kg)   SpO2 96%   BMI 27.64 kg/m²     General: No acute distress. Eyes: Sclera anicteric. ENT: No oral lesions. Thyroid normal.  Nodes: No adenopathy. Skin: No spider angiomata. No jaundice. No palmar erythema. Respiratory: Lungs clear to auscultation. Cardiovascular: Regular heart rate. No murmurs. No JVD. Abdomen: Soft non-tender. Liver size normal to percussion/palpation. Spleen not palpable. No obvious ascites. Extremities: No edema. No muscle wasting. No gross arthritic changes. Neurologic: Alert and oriented. Cranial nerves grossly intact. No asterixis.     LABORATORY STUDIES:  Liver [place] of [address] [place] [address] & Units 2022   WBC 3.6 - 11.0 K/uL 15.3 (H) 9.1   ANC 1.8 - 8.0 K/UL 12.8 (H) 4.5   HGB 11.5 - 16.0 g/dL 13.0 12.7    - 400 K/uL 286 233   AST 15 - 37 U/L 9 (L) 14 (L)   ALT 12 - 78 U/L 24 26   Alk Phos 45 - 117 U/L 81 77   Bili, Total 0.2 - 1.0 MG/DL 0.5 0.9   Bili, Direct 0.0 - 0.2 MG/DL 0.1    Albumin 3.5 - 5.0 g/dL 3.9 3.7   BUN 6 - 20 MG/DL 18 12   Creat 0.55 - 1.02 MG/DL 0.80 0.73   Na 136 - 145 mmol/L 139 140   K 3.5 - 5.1 mmol/L 4.0 3.9   Cl 97 - 108 mmol/L 107 107   CO2 21 - 32 mmol/L 24 28   Glucose 65 - 100 mg/dL 149 (H) 88   Magnesium 1.6 - 2.4 mg/dL  2.4     Cancer Screening Latest Ref Rng & Units 8/4/2022   AFP, Serum 0.0 - 9.2 ng/mL 2.1   AFP-L3% 0.0 - 9.9 % Comment   CA 19-9 0 - 35 U/mL 49 (H)     SEROLOGIES:  Serologies Latest Ref Rng & Units 8/4/2022   Hep B Surface Ag Index <0.10   Hep B Surface Ag Interp Negative   Negative   Hep C Ab 0.0 - 0.9 s/co ratio <0.1     LIVER HISTOLOGY:  Not available or performed    ENDOSCOPIC PROCEDURES:  Not available or performed    RADIOLOGY:  7/2022. CT scan abdomen without IV contrast.  Normal appearing liver. No liver mass lesions. Normal spleen. No ascites. OTHER TESTING:  Not available or performed    FOLLOW-UP:  All of the issues listed above in the Assessment and Plan were discussed with the patient. All questions were answered. The patient expressed a clear understanding of the above. [facility] 87 in 6 months which should be 1-2 weeks after the next imaging study and labs. If Dr Mil Boyle will be performing the lab and CT, and if these are negative on repeat in 6 months then no follow-up is needed.         Christiano Jerez MD  [facility] of [address], [address] [place] [address], [facility] 22.  [phone]  [address] [place] [address]

## 2022-09-16 NOTE — PROGRESS NOTES
Identified pt with two pt identifiers(name and ). Reviewed record in preparation for visit and have obtained necessary documentation. Chief Complaint   Patient presents with    Other     Liver mass  4 week f/u      Vitals:    22 0742   BP: 135/80   Pulse: 72   Resp: 16   Temp: (!) 96.4 °F (35.8 °C)   TempSrc: Temporal   SpO2: 96%   Weight: 161 lb (73 kg)   Height: 5' 4\" (1.626 m)   PainSc:   0 - No pain       Health Maintenance Review: Patient reminded of \"due or due soon\" health maintenance. I have asked the patient to contact his/her primary care provider (PCP) for follow-up on his/her health maintenance. Coordination of Care Questionnaire:  :   1) Have you been to an emergency room, urgent care, or hospitalized since your last visit? If yes, where when, and reason for visit? no       2. Have seen or consulted any other health care provider since your last visit? If yes, where when, and reason for visit? NO      Patient is accompanied by self I have received verbal consent from Froy Sullivan to discuss any/all medical information while they are present in the room.

## 2022-10-11 PROBLEM — D18.03 HEMANGIOMA OF LIVER: Status: ACTIVE | Noted: 2022-08-04

## 2022-10-11 PROBLEM — K76.89 LIVER CYST: Status: ACTIVE | Noted: 2022-10-11

## 2023-01-18 ENCOUNTER — TELEPHONE (OUTPATIENT)
Dept: HEMATOLOGY | Age: 73
End: 2023-01-18

## 2023-01-18 DIAGNOSIS — R97.8 OTHER ABNORMAL TUMOR MARKERS: ICD-10-CM

## 2023-01-18 DIAGNOSIS — D18.03 HEMANGIOMA OF LIVER: ICD-10-CM

## 2023-01-18 DIAGNOSIS — R16.0 LIVER MASS: Primary | ICD-10-CM

## 2023-01-18 NOTE — TELEPHONE ENCOUNTER
Patient called to inquire about possible repeat of labs. She said she was told at her last visit that she may need to repeat one of the labs ordered if it was abnormal. Lab work was completed 8/2022. Please refer to follow up plan for LOV of 9/16/22. Patient also stated that her PCP, Dr. Amilcar Wayne has no knowledge of this office visit and has not been copied on it. Please call patient to discuss.

## 2023-01-18 NOTE — TELEPHONE ENCOUNTER
[email or web address] Faxed last office not to PCP. I don't see this patient having a follow up appt. Seen your last office note from 9/16/22 stating PCP if he obtain labs/CT if negative no follow up needed. (KAMILA)  Message  Received: Today  MD Sherie Trotter RN  Caller: Unspecified Ijeoma Barnett, 10:49 AM)  She asked if Dr Shikha Medeiros could do FU CT. Tell her we sent note to Dr Shikha Medeiros. If he gets CT in 6 months and there is no change, which is what we expect, then no FU needed. If Shikha Medeiros does not want to order FU CT then we can order and she comes back for FU after CT. Again, this was her request.       [email or web address] Attempt to call patient with above message from [facility] no answer left detail voice message. Johana Wolfe)      [email or web address] Spoke w/patient concerning CT scan and follow up lab work. Patient decline CT related to having prior MRI but request labs be mailed and she will have done at [address] [place] [address] Road will be placed in mail today. Patient verbalize understanding. (KAMILA)

## 2023-05-23 RX ORDER — OMEPRAZOLE 40 MG/1
CAPSULE, DELAYED RELEASE ORAL
COMMUNITY

## 2023-05-23 RX ORDER — BETAMETHASONE DIPROPIONATE 0.5 MG/G
CREAM TOPICAL 2 TIMES DAILY
COMMUNITY
Start: 2022-03-02

## 2023-05-23 RX ORDER — TRIAMCINOLONE ACETONIDE 0.25 MG/G
OINTMENT TOPICAL
COMMUNITY
Start: 2022-05-20

## 2023-05-23 RX ORDER — LEVOTHYROXINE SODIUM 0.1 MG/1
TABLET ORAL
COMMUNITY
Start: 2022-05-26

## 2023-05-23 RX ORDER — VIBEGRON 75 MG/1
75 TABLET, FILM COATED ORAL DAILY
COMMUNITY
Start: 2022-07-16

## 2023-05-23 RX ORDER — OFLOXACIN 3 MG/ML
SOLUTION/ DROPS OPHTHALMIC
COMMUNITY
Start: 2022-08-15

## 2023-10-28 ENCOUNTER — HOSPITAL ENCOUNTER (OUTPATIENT)
Facility: HOSPITAL | Age: 73
End: 2023-10-28
Payer: MEDICARE

## 2023-10-28 DIAGNOSIS — M79.604 LOW BACK PAIN RADIATING TO RIGHT LEG: ICD-10-CM

## 2023-10-28 DIAGNOSIS — M54.50 LOW BACK PAIN RADIATING TO RIGHT LEG: ICD-10-CM

## 2023-10-28 DIAGNOSIS — M25.551 PAIN OF RIGHT HIP JOINT: ICD-10-CM

## 2023-10-28 DIAGNOSIS — R29.898 WEAKNESS OF RIGHT LEG: ICD-10-CM

## 2023-10-28 PROCEDURE — 73721 MRI JNT OF LWR EXTRE W/O DYE: CPT

## 2024-09-16 ENCOUNTER — HOSPITAL ENCOUNTER (OUTPATIENT)
Facility: HOSPITAL | Age: 74
Discharge: HOME | End: 2024-09-19
Payer: MEDICARE

## 2024-09-16 DIAGNOSIS — M54.16 BILATERAL LUMBAR RADICULOPATHY: ICD-10-CM

## 2024-09-16 PROCEDURE — 72148 MRI LUMBAR SPINE W/O DYE: CPT
